# Patient Record
Sex: FEMALE | Race: WHITE | NOT HISPANIC OR LATINO | URBAN - NONMETROPOLITAN AREA
[De-identification: names, ages, dates, MRNs, and addresses within clinical notes are randomized per-mention and may not be internally consistent; named-entity substitution may affect disease eponyms.]

---

## 2018-05-25 PROBLEM — H52.4: Noted: 2018-05-25

## 2018-05-25 PROBLEM — H52.13: Noted: 2018-05-25

## 2019-06-05 ENCOUNTER — IMPORTED ENCOUNTER (OUTPATIENT)
Dept: URBAN - NONMETROPOLITAN AREA CLINIC 1 | Facility: CLINIC | Age: 49
End: 2019-06-05

## 2019-06-05 PROCEDURE — S0621 ROUTINE OPHTHALMOLOGICAL EXA: HCPCS

## 2020-07-29 ENCOUNTER — IMPORTED ENCOUNTER (OUTPATIENT)
Dept: URBAN - NONMETROPOLITAN AREA CLINIC 1 | Facility: CLINIC | Age: 50
End: 2020-07-29

## 2020-07-29 PROBLEM — H52.4: Noted: 2018-05-25

## 2020-07-29 PROBLEM — H52.13: Noted: 2018-05-25

## 2020-07-29 PROBLEM — H04.123: Noted: 2020-07-29

## 2020-07-29 PROBLEM — H16.223: Noted: 2020-07-29

## 2020-07-29 PROCEDURE — 92014 COMPRE OPH EXAM EST PT 1/>: CPT

## 2020-07-29 PROCEDURE — 92015 DETERMINE REFRACTIVE STATE: CPT

## 2020-07-29 NOTE — PATIENT DISCUSSION
Myopia-Discussed diagnosis with patient. -Explained that people who are myopic are at a higher risk for developing RD/RT and reviewed associated S&S.-Pt to contact our office if symptoms develop. Presbyopia-Discussed diagnosis with patient. Updated spec Rx given. Recommend lens that will provide comfort as well as protect safety and health of eyes. MAE-Explained MAE and associated symptoms.-Recommend increasing Omega 3s.-Pt to begin artificial tears OU QID PRN. Pt will contact us if this does not provide relief. Consider punctal plugs in that case.

## 2021-08-03 ENCOUNTER — IMPORTED ENCOUNTER (OUTPATIENT)
Dept: URBAN - NONMETROPOLITAN AREA CLINIC 1 | Facility: CLINIC | Age: 51
End: 2021-08-03

## 2021-08-03 PROCEDURE — 92015 DETERMINE REFRACTIVE STATE: CPT

## 2021-08-03 PROCEDURE — 92014 COMPRE OPH EXAM EST PT 1/>: CPT

## 2021-08-03 NOTE — PATIENT DISCUSSION
Myopia-Discussed diagnosis with patient. -Explained that people who are myopic are at a higher risk for developing RD/RT and reviewed associated S&S.-Pt to contact our office if symptoms develop. Presbyopia-Discussed diagnosis with patient. Updated spec Rx given. Recommend lens that will provide comfort as well as protect safety and health of eyes. MAE-Explained MAE and associated symptoms.-Recommend increasing Omega 3s.-Pt to begin artificial tears OU QID PRN. Pt will contact us if this does not provide relief.  Consider punctal plugs in that case.; 's Notes: Wants to be told ahead of time if she will be dilated so she can arrange a  0/3/92 JL

## 2022-04-09 ASSESSMENT — VISUAL ACUITY
OD_SC: 20/20-3
OS_CC: J1+
OU_CC: 20/20
OD_CC: J1+
OD_CC: J1
OS_SC: 20/25-2
OS_SC: 20/20
OS_CC: J1
OD_SC: 20/30-2
OD_SC: 20/30+3
OS_SC: 20/20

## 2022-04-09 ASSESSMENT — TONOMETRY
OD_IOP_MMHG: 14
OS_IOP_MMHG: 15
OD_IOP_MMHG: 15
OS_IOP_MMHG: 13
OD_IOP_MMHG: 15
OS_IOP_MMHG: 15

## 2022-12-08 ENCOUNTER — COMPREHENSIVE EXAM (OUTPATIENT)
Dept: RURAL CLINIC 1 | Facility: CLINIC | Age: 52
End: 2022-12-08

## 2022-12-08 PROCEDURE — 92014 COMPRE OPH EXAM EST PT 1/>: CPT

## 2022-12-08 PROCEDURE — 92015 DETERMINE REFRACTIVE STATE: CPT

## 2022-12-08 ASSESSMENT — VISUAL ACUITY
OU_CC: 20/25
OS_CC: 20/25
OD_CC: 20/25

## 2022-12-08 ASSESSMENT — TONOMETRY
OD_IOP_MMHG: 19
OS_IOP_MMHG: 19

## 2023-12-14 ENCOUNTER — COMPREHENSIVE EXAM (OUTPATIENT)
Dept: RURAL CLINIC 1 | Facility: CLINIC | Age: 53
End: 2023-12-14

## 2023-12-14 DIAGNOSIS — H52.4: ICD-10-CM

## 2023-12-14 DIAGNOSIS — H52.13: ICD-10-CM

## 2023-12-14 DIAGNOSIS — H16.223: ICD-10-CM

## 2023-12-14 PROCEDURE — 92015 DETERMINE REFRACTIVE STATE: CPT

## 2023-12-14 PROCEDURE — 92014 COMPRE OPH EXAM EST PT 1/>: CPT

## 2023-12-14 ASSESSMENT — VISUAL ACUITY
OS_CC: 20/20
OU_CC: 20/20
OD_CC: 20/20

## 2023-12-14 ASSESSMENT — TONOMETRY
OD_IOP_MMHG: 18
OS_IOP_MMHG: 18

## 2025-01-07 ENCOUNTER — COMPREHENSIVE EXAM (OUTPATIENT)
Age: 55
End: 2025-01-07

## 2025-01-07 DIAGNOSIS — H52.4: ICD-10-CM

## 2025-01-07 DIAGNOSIS — H16.223: ICD-10-CM

## 2025-01-07 DIAGNOSIS — H52.13: ICD-10-CM

## 2025-01-07 PROCEDURE — 92014 COMPRE OPH EXAM EST PT 1/>: CPT

## 2025-01-07 PROCEDURE — 92015 DETERMINE REFRACTIVE STATE: CPT
